# Patient Record
Sex: MALE | Race: OTHER | HISPANIC OR LATINO | URBAN - METROPOLITAN AREA
[De-identification: names, ages, dates, MRNs, and addresses within clinical notes are randomized per-mention and may not be internally consistent; named-entity substitution may affect disease eponyms.]

---

## 2018-07-27 ENCOUNTER — EMERGENCY (EMERGENCY)
Facility: HOSPITAL | Age: 43
LOS: 1 days | Discharge: HOME | End: 2018-07-27
Attending: EMERGENCY MEDICINE | Admitting: EMERGENCY MEDICINE
Payer: MEDICAID

## 2018-07-27 VITALS
RESPIRATION RATE: 16 BRPM | OXYGEN SATURATION: 95 % | DIASTOLIC BLOOD PRESSURE: 83 MMHG | TEMPERATURE: 98 F | SYSTOLIC BLOOD PRESSURE: 119 MMHG | HEART RATE: 84 BPM

## 2018-07-27 PROCEDURE — 99220: CPT

## 2018-07-27 RX ORDER — DIAZEPAM 5 MG
5 TABLET ORAL ONCE
Qty: 0 | Refills: 0 | Status: DISCONTINUED | OUTPATIENT
Start: 2018-07-27 | End: 2018-07-27

## 2018-07-27 RX ORDER — LIDOCAINE 4 G/100G
1 CREAM TOPICAL ONCE
Qty: 0 | Refills: 0 | Status: COMPLETED | OUTPATIENT
Start: 2018-07-27 | End: 2018-07-27

## 2018-07-27 RX ORDER — KETOROLAC TROMETHAMINE 30 MG/ML
15 SYRINGE (ML) INJECTION ONCE
Qty: 0 | Refills: 0 | Status: DISCONTINUED | OUTPATIENT
Start: 2018-07-27 | End: 2018-07-27

## 2018-07-27 RX ORDER — ACETAMINOPHEN 500 MG
975 TABLET ORAL ONCE
Qty: 0 | Refills: 0 | Status: COMPLETED | OUTPATIENT
Start: 2018-07-27 | End: 2018-07-27

## 2018-07-27 RX ADMIN — Medication 975 MILLIGRAM(S): at 22:08

## 2018-07-27 RX ADMIN — Medication 5 MILLIGRAM(S): at 22:08

## 2018-07-27 RX ADMIN — LIDOCAINE 1 PATCH: 4 CREAM TOPICAL at 22:44

## 2018-07-27 RX ADMIN — Medication 15 MILLIGRAM(S): at 22:10

## 2018-07-27 NOTE — ED ADULT NURSE NOTE - OBJECTIVE STATEMENT
Pt received in spot 16, A&Ox3, Frisian speaking, translation services offered, pt prefers brother for translation.  c/o R buttock radiating down R leg pain for approx 4 weeks.  Pt was seen and treated at [facility] twice since the start, was diagnosed with sciatica, given medication and told to follow up with PMD, but never did.  Pt denies any other physical complaints.  Appears comfortable in stretcher.  Medicated as per MD orders.  Will continue to monitor.

## 2018-07-27 NOTE — ED CDU PROVIDER INITIAL DAY NOTE - NS CPE EDP MUSC LUMBAR LOC
tenderness/right lower paraspinal tenderness, worst over sacroiliac joint. No vpt, no step offs, no discoloration, no deformities.

## 2018-07-27 NOTE — ED ADULT NURSE NOTE - CHIEF COMPLAINT QUOTE
Pt walk in c/o Pain on Right Buttocks radiating to Right Lower Leg for 4 weeks. Seen at [facility], Dx Sciatica, D/C given medication. Supposed to made an appointment  but unable to due to long waitlist and the symptoms is worsening. Denies any incontinence. Pt is  very uncomfortable in Triage Unable to stand, bear weight

## 2018-07-27 NOTE — ED PROVIDER NOTE - MEDICAL DECISION MAKING DETAILS
Trace: R sciatica (either from back or from piriformis). Plan: pain control, CDU for MRI and pain control and PT/walker.

## 2018-07-27 NOTE — ED CDU PROVIDER INITIAL DAY NOTE - MEDICAL DECISION MAKING DETAILS
Acute on chronic low back pain, now accompanied by radiation down right leg causing difficulty ambulating- will obtain mri, pain control Acute on chronic low back pain, now accompanied by radiation down right leg causing difficulty ambulating- will obtain mri, pain control; pt complains only of calf pain during the radiation of pain originating in back. No calf tenderness on exam, no swelling/discoloration and no risk factors for dvt. Will accept to cdu prior to the completion of LE duplex and follow results.

## 2018-07-27 NOTE — ED PROVIDER NOTE - ATTENDING CONTRIBUTION TO CARE
I performed a face-to-face evaluation of the patient and performed a history and physical examination. I agree with the history and physical examination.    Trace: R sciatica (either from back or from piriformis). Plan: pain control, CDU for MRI and pain control and PT/walker.

## 2018-07-27 NOTE — ED CDU PROVIDER INITIAL DAY NOTE - ATTENDING CONTRIBUTION TO CARE
Dr. Callejas: I performed a face to face bedside interview with patient regarding history of present illness, review of symptoms and past medical history. I completed an independent physical exam.  I have discussed patient's plan of care with PA.   I agree with note as stated above, having amended the EMR as needed to reflect my findings.   This includes HISTORY OF PRESENT ILLNESS, HIV, PAST MEDICAL/SURGICAL/FAMILY/SOCIAL HISTORY, ALLERGIES AND HOME MEDICATIONS, REVIEW OF SYSTEMS, PHYSICAL EXAM, and any PROGRESS NOTES during the time I functioned as the attending physician for this patient. 42M p/w acute on chronic back pain, CDU for MRI and pain control. Past 6-7 years patient has chronic low back pain. No formal diagnosis. Baseline ambulatory without assistance. Past 6 weeks patient notes back pain rad numbness and tingling RLE, denies acute trauma. Came to ER as he now ambulates with a cane. Patient denies mvc, fall, trauma, h/o slipped disc or back surgery. Patient denies fever, dm, hiv, ivdu, invasive spinal procedures, saddle anesthesia, urinary or fecal incontinence. PE facial grimacing, ambulates stiffly and painfully with cane, ctabl, rrr, s1s2, abd soft ntnd, rt para-lumbar spinal ttp, rle 4/5 str, b/l le sensation intact. PLAN MRI l-spine, pain control.

## 2018-07-27 NOTE — ED PROVIDER NOTE - OBJECTIVE STATEMENT
Trace: 42 M, years of R low back pain, 6 weeks of pain radiating to R foot. No neuro deficits or bowel/bladder incontinence. No cancer/fever/IVDA/trauma. Seen twice in last 3 weeks at [facility]. Got Robaxin and Motrin and a cane; no relief. Trace: 42 M, 7 years of R low back pain, 6 weeks of pain radiating to R foot. Occasional numbness R foot and lateral lower leg. No neuro deficits or bowel/bladder incontinence. No cancer/fever/IVDA/trauma. Seen twice in last 3 weeks at [facility]. Got Robaxin and Motrin and a cane; no relief.

## 2018-07-27 NOTE — ED CDU PROVIDER INITIAL DAY NOTE - OBJECTIVE STATEMENT
Trace: 42 M, 7 years of R low back pain, 6 weeks of pain radiating to R foot. Occasional numbness R foot and lateral lower leg. No neuro deficits or bowel/bladder incontinence. No cancer/fever/IVDA/trauma. Seen twice in last 3 weeks at [facility]. Got Robaxin and Motrin and a cane; no relief.    CDU HANK Beckman: agree with above. 42 year old male, no PMHx, presents to the ED for acute on chronic low back pain. Patient states he has had chronic low back pain for the last 6-7 years. However, for the last six weeks he has had worsening pain which is now accompanied by radiation down posterior right leg with intermittent numbness/tingling to right lateral foot. He was seen at [facility] and received medications without relief of pain and has been attempting to follow up with a specialist but has been unable to get an appointment. The patient works in construction and pain is now so severe he is using a cane to ambulate. He denies trauma/injury, changes in bowel/bladder habits, recent travel, tobacco use or other complaints.

## 2018-07-27 NOTE — ED ADULT TRIAGE NOTE - CHIEF COMPLAINT QUOTE
Pt walk in c/o Pain on Right Buttocks radiating to Right Lower Leg for 4 weeks. Seen at [facility], Dx Sciatica, D/C given medication. Supposed to made an appointment  but unable to due to long waitlist and the symptoms is worsening. Denies any incontinence. Pt is  very uncomfortable in Triage Unable to stand, bear weight

## 2018-07-28 VITALS
HEART RATE: 63 BPM | TEMPERATURE: 98 F | DIASTOLIC BLOOD PRESSURE: 64 MMHG | RESPIRATION RATE: 18 BRPM | OXYGEN SATURATION: 96 % | SYSTOLIC BLOOD PRESSURE: 115 MMHG

## 2018-07-28 PROCEDURE — 93971 EXTREMITY STUDY: CPT | Mod: 26,LT

## 2018-07-28 PROCEDURE — 72148 MRI LUMBAR SPINE W/O DYE: CPT | Mod: 26

## 2018-07-28 PROCEDURE — 99217: CPT

## 2018-07-28 RX ORDER — OXYCODONE HYDROCHLORIDE 5 MG/1
5 TABLET ORAL ONCE
Qty: 0 | Refills: 0 | Status: DISCONTINUED | OUTPATIENT
Start: 2018-07-28 | End: 2018-07-28

## 2018-07-28 RX ORDER — IBUPROFEN 200 MG
1 TABLET ORAL
Qty: 20 | Refills: 0 | OUTPATIENT
Start: 2018-07-28 | End: 2018-08-01

## 2018-07-28 RX ORDER — IBUPROFEN 200 MG
600 TABLET ORAL ONCE
Qty: 0 | Refills: 0 | Status: COMPLETED | OUTPATIENT
Start: 2018-07-28 | End: 2018-07-28

## 2018-07-28 RX ORDER — OXYCODONE AND ACETAMINOPHEN 5; 325 MG/1; MG/1
1 TABLET ORAL ONCE
Qty: 0 | Refills: 0 | Status: DISCONTINUED | OUTPATIENT
Start: 2018-07-28 | End: 2018-07-28

## 2018-07-28 RX ADMIN — OXYCODONE HYDROCHLORIDE 5 MILLIGRAM(S): 5 TABLET ORAL at 01:45

## 2018-07-28 RX ADMIN — OXYCODONE AND ACETAMINOPHEN 1 TABLET(S): 5; 325 TABLET ORAL at 19:16

## 2018-07-28 RX ADMIN — Medication 600 MILLIGRAM(S): at 19:16

## 2018-07-28 RX ADMIN — OXYCODONE HYDROCHLORIDE 5 MILLIGRAM(S): 5 TABLET ORAL at 02:45

## 2018-07-28 RX ADMIN — Medication 600 MILLIGRAM(S): at 17:47

## 2018-07-28 RX ADMIN — OXYCODONE AND ACETAMINOPHEN 1 TABLET(S): 5; 325 TABLET ORAL at 17:47

## 2018-07-28 RX ADMIN — LIDOCAINE 1 PATCH: 4 CREAM TOPICAL at 12:50

## 2018-07-28 NOTE — ED CDU PROVIDER DISPOSITION NOTE - ATTENDING CONTRIBUTION TO CARE
Dr. Callejas: I performed a face to face bedside interview with patient regarding history of present illness, review of symptoms and past medical history. I completed an independent physical exam.  I have discussed patient's plan of care with PA.   I agree with note as stated above, having amended the EMR as needed to reflect my findings.   This includes HISTORY OF PRESENT ILLNESS, HIV, PAST MEDICAL/SURGICAL/FAMILY/SOCIAL HISTORY, ALLERGIES AND HOME MEDICATIONS, REVIEW OF SYSTEMS, PHYSICAL EXAM, and any PROGRESS NOTES during the time I functioned as the attending physician for this patient. 42M p/w acute on chronic back pain, CDU for MRI and pain control. Past 6-7 years patient has chronic low back pain. No formal diagnosis. Baseline ambulatory without assistance. Past 6 weeks patient notes back pain rad numbness and tingling RLE, denies acute trauma. Came to ER as he now ambulates with a cane. Patient denies mvc, fall, trauma, h/o slipped disc or back surgery. Patient denies fever, dm, hiv, ivdu, invasive spinal procedures, saddle anesthesia, urinary or fecal incontinence. CDU PE facial grimacing, ambulates stiffly and painfully with cane, ctabl, rrr, s1s2, abd soft ntnd, rt para-lumbar spinal ttp, rle 4/5 str, b/l le sensation intact.  CDU COURSE: Pain control with toradol and percocet; MRI L5-S1 large right lateral recess disc extrusion exerts val mass effect on descending S1 and S2 nerve root. Patient seen by Ortho covering for Spine Surgery, per Ortho Surg ok to follow-up in clinic dc with pain meds. Results, dc instructions, return precautions, and need for follow-up reviewed with patient. Stable dc home.

## 2018-07-28 NOTE — ED CDU PROVIDER SUBSEQUENT DAY NOTE - HISTORY
42 year old male with a PMhx of chronic lower back pain pw acute on chronic back pain for the past 6 weeks that began to radiate down the right leg associated with numbness/tingling down the right leg. Pt is ambulatory with a cane but has not had any relief with Robaxin/Ibuprofen. This is the patients 3rd visit to the ED within the past 3 weeks. Denies n/v/f/c, CP, SOB, abdominal pain, dysuria, hematuria, melena, hematochezia, weakness, saddle anesthesia, urinary/bowel incontinence, urinary retention.   Plan: MRI, pain management 42 year old male with a PMhx of chronic lower back pain pw acute on chronic back pain for the past 6 weeks that began to radiate down the right leg associated with numbness/tingling down the right leg. Pt is ambulatory with a cane but has not had any relief with Robaxin/Ibuprofen. This is the patients 3rd visit to the ED within the past 3 weeks. Denies n/v/f/c, CP, SOB, abdominal pain, dysuria, hematuria, melena, hematochezia, weakness, saddle anesthesia, urinary/bowel incontinence, urinary retention.   Plan: MRI, pain management    Júnior att: 42M p/w acute on chronic back pain, CDU for MRI and pain control. Past 6-7 years patient has chronic low back pain. No formal diagnosis. Baseline ambulatory without assistance. Past 6 weeks patient notes back pain rad numbness and tingling RLE, denies acute trauma. Came to ER as he now ambulates with a cane. Patient denies mvc, fall, trauma, h/o slipped disc or back surgery. Patient denies fever, dm, hiv, ivdu, invasive spinal procedures, saddle anesthesia, urinary or fecal incontinence.

## 2018-07-28 NOTE — ED CDU PROVIDER SUBSEQUENT DAY NOTE - DATE/TIME 1
pt states, " I have a pain in my lower back that radiates down the back of my leg " pt admits to having and MRI, XRAYS AND has seen an orthopedic doctor 28-Jul-2018 00:51

## 2018-07-28 NOTE — ED CDU PROVIDER DISPOSITION NOTE - PLAN OF CARE
Limit further injury, over exertion, and rest affected area. Percocet 1 tablet every 6 hrs as needed for breakthrough discomfort- caution drowsiness while taking this medication- do not drive or operate heavy machinery. Take motrin 600mg every 6h as needed for pain. Please continue all home medications as directed. See your regular doctor within 72 hours for follow-up care. Follow up with Dr. Baltazar this week [phone]) to discuss elective surgery - Address: [address] [place] [address]. Return to ER for new or worsening symptoms.

## 2018-07-28 NOTE — ED CDU PROVIDER SUBSEQUENT DAY NOTE - MEDICAL DECISION MAKING DETAILS
42 year old male with a PMhx of chronic lower back pain pw acute on chronic back pain for the past 6 weeks that began to radiate down the right leg.  Plan: pain management, MRI

## 2018-07-28 NOTE — CONSULT NOTE ADULT - SUBJECTIVE AND OBJECTIVE BOX
Patient is a 42y Male who presents c/o right sided weakness and pain for last 6 weeks. No clear inciting event. Denies trauma. Denies pain/injury elsewhere.Denies bowel/bladder incontinence. Has been to multiple ED's over past 6 weeks and has been unable to follow-up with a spine surgeon. He complains of weakness with ambulation ow requiring a marsh and has pain in right buttocks and right calf with some heel paresthesias.     HEALTH ISSUES - PROBLEM Dx:      MEDICATIONS  (STANDING):  ibuprofen  Tablet 600 milliGRAM(s) Oral once  oxyCODONE    5 mG/acetaminophen 325 mG 1 Tablet(s) Oral once      Allergies    No Known Allergies    Intolerances        PAST MEDICAL & SURGICAL HISTORY:  No pertinent past medical history  No significant past surgical history        Vital Signs Last 24 Hrs  T(C): 36.9 (07-28-18 @ 14:49), Max: 36.9 (07-27-18 @ 20:29)  T(F): 98.4 (07-28-18 @ 14:49), Max: 98.5 (07-27-18 @ 20:29)  HR: 63 (07-28-18 @ 14:49) (55 - 84)  BP: 115/64 (07-28-18 @ 14:49) (107/74 - 132/76)  BP(mean): --  RR: 18 (07-28-18 @ 14:49) (15 - 18)  SpO2: 96% (07-28-18 @ 14:49) (95% - 99%)    Gen: NAD    Spine PE:  Skin intact  No gross deformity  No midline TTP C/T/L/S spine  No bony step offs  No paraspinal muscle ttp/hypertonicity   Negative clonus  Negative babinski  Negative motta  + rectal tone  No saddle anesthesia  Pain and mild  tenderess over right greater trochanter region, tenderness right calf    Motor:                   C5                C6              C7               C8           T1   R            5/5                5/5            5/5             5/5          5/5  L             5/5               5/5             5/5             5/5          5/5                L2             L3             L4               L5            S1  R         5/5           5/5          4/5             3/5           3/5  L          5/5          5/5           5/5             5/5           5/5    Sensory:            C5         C6         C7      C8       T1        (0=absent, 1=impaired, 2=normal, NT=not testable)  R         2            2           2        2         2  L          2            2           2        2         2               L2          L3         L4      L5       S1         (0=absent, 1=impaired, 2=normal, NT=not testable)  R         2            2            1        1        1  L          2            2           2        2         2    Imaging: L5-S1 large right lateral recess disc extrusion    A/P: 42y Male with L5-S1 herniated disc  - patient can benefit from elective surgery but no acute orthopedic intervention at this time due to chronicity  - WBAT with assistive devices as needed  - NSAIDS, pain control  - follow-up in clinic with Dr. Baltazar this week [phone]) to discuss elective surgery (regardless of insurance status)  - discussed with attending

## 2018-07-28 NOTE — ED CDU PROVIDER SUBSEQUENT DAY NOTE - ATTENDING CONTRIBUTION TO CARE
Dr. Callejas: I performed a face to face bedside interview with patient regarding history of present illness, review of symptoms and past medical history. I completed an independent physical exam.  I have discussed patient's plan of care with PA.   I agree with note as stated above, having amended the EMR as needed to reflect my findings.   This includes HISTORY OF PRESENT ILLNESS, HIV, PAST MEDICAL/SURGICAL/FAMILY/SOCIAL HISTORY, ALLERGIES AND HOME MEDICATIONS, REVIEW OF SYSTEMS, PHYSICAL EXAM, and any PROGRESS NOTES during the time I functioned as the attending physician for this patient. 42M p/w acute on chronic back pain, CDU for MRI and pain control. Past 6-7 years patient has chronic low back pain. No formal diagnosis. Baseline ambulatory without assistance. Past 6 weeks patient notes back pain rad numbness and tingling RLE, denies acute trauma. Came to ER as he now ambulates with a cane. Patient denies mvc, fall, trauma, h/o slipped disc or back surgery. Patient denies fever, dm, hiv, ivdu, invasive spinal procedures, saddle anesthesia, urinary or fecal incontinence. CDU PE facial grimacing, ambulates stiffly and painfully with cane, ctabl, rrr, s1s2, abd soft ntnd, rt para-lumbar spinal ttp, rle 4/5 str, b/l le sensation intact.  CDU PLAN MRI l-spine, pain control.

## 2018-07-28 NOTE — ED CDU PROVIDER DISPOSITION NOTE - CLINICAL COURSE
Júnior att: 42M p/w acute on chronic back pain, CDU for MRI and pain control. Past 6-7 years patient has chronic low back pain. No formal diagnosis. Baseline ambulatory without assistance. Past 6 weeks patient notes back pain rad numbness and tingling RLE, denies acute trauma. Came to ER as he now ambulates with a cane. Patient denies mvc, fall, trauma, h/o slipped disc or back surgery. Patient denies fever, dm, hiv, ivdu, invasive spinal procedures, saddle anesthesia, urinary or fecal incontinence. CDU PE facial grimacing, ambulates stiffly and painfully with cane, ctabl, rrr, s1s2, abd soft ntnd, rt para-lumbar spinal ttp, rle 4/5 str, b/l le sensation intact.  CDU COURSE: Pain control with toradol and percocet; MRI L5-S1 large right lateral recess disc extrusion exerts val mass effect on descending S1 and S2 nerve root. Patient seen by Ortho covering for Spine Surgery, per Ortho Surg ok to follow-up in clinic dc with pain meds. Results, dc instructions, return precautions, and need for follow-up reviewed with patient. Stable dc home.

## 2020-02-03 NOTE — ED CDU PROVIDER SUBSEQUENT DAY NOTE - MUSCULOSKELETAL [+], MLM
[FreeTextEntry1] : This is a 53-year-old male for annual health assessment.\par \par  [de-identified] : The patient's major complaints or urticaria which began years ago this was attributed to nonsteroidals which he discontinued he did have an episode of angioedema at that time. This reoccurred recently he was diagnosed as having thyroiditis as per his primary doctor at the time he was seen by endocrinology who told him that his thyroid was fine\par \par He is also complaining of right sided abdominal pain in the rib and upper abdomen. He apparently had a CT of the abdomen which was unrevealing BACK PAIN

## 2020-03-09 NOTE — ED CDU PROVIDER INITIAL DAY NOTE - AREA
lumbar 5-Fu Counseling: 5-Fluorouracil Counseling:  I discussed with the patient the risks of 5-fluorouracil including but not limited to erythema, scaling, itching, weeping, crusting, and pain.

## 2023-03-01 NOTE — ED CDU PROVIDER INITIAL DAY NOTE - CROS ED CONS ALL NEG
Render Post-Care Instructions In Note?: no Show Topical Anesthesia Variable?: Yes Detail Level: Zone Medical Necessity Clause: This procedure was medically necessary because the lesions that were treated were: Medical Necessity Information: It is in your best interest to select a reason for this procedure from the list below. All of these items fulfill various CMS LCD requirements except the new and changing color options. Spray Paint Text: The liquid nitrogen was applied to the skin utilizing a spray paint frosting technique. Post-Care Instructions: I reviewed with the patient in detail post-care instructions. Patient is to wear sunprotection, and avoid picking at any of the treated lesions. Pt may apply Vaseline to crusted or scabbing areas. Consent: The patient's consent was obtained including but not limited to risks of crusting, scabbing, blistering, scarring, darker or lighter pigmentary change, recurrence, incomplete removal and infection. Duration Of Freeze Thaw-Cycle (Seconds): 0 Detail Level: Detailed negative...